# Patient Record
(demographics unavailable — no encounter records)

---

## 2025-03-12 NOTE — ASSESSMENT
[FreeTextEntry1] :   Alert, oriented, well pleasant.   Sun-exposed cutaneous exam. No evidence of cutaneous malignancy.  Brown, yellow papules and plaques generalized.  Seborrheic keratoses. No treatment.  Actinic damage. Reviewed sun protection.  Brown macules and papules less than 0.6cm generalized especially trunk.  Nevi. No treatment.  Erythematous papules especially trunk. Angioma. No treatment.   irregular bordered multicolored brown thin papule left anterior shoulder. Mass ? nevus Informed consent given. Side effects discussed.  Lidocaine epi.  Shave biopsy.  Ferric Chloride.  Tolerated well.  Wound care instructions given.   kerotic erythematous 5mm papule left forehead Inflamed seborrheic keratosis Informed consent given. Side effects discussed. Electrodesiccation Tolerated well. Wound care instructed.  Follow up 1 year.

## 2025-03-12 NOTE — HISTORY OF PRESENT ILLNESS
Detail Level: Detailed Number Of Freeze-Thaw Cycles: 2 freeze-thaw cycles [FreeTextEntry1] : growing bump left forehead, picks. For months. No treatment. Show Applicator Variable?: Yes [de-identified] : No personal or family history of cutaneous malignancy.   Post-Care Instructions: I reviewed with the patient in detail post-care instructions. Patient is to wear sunprotection, and avoid picking at any of the treated lesions. Pt may apply Vaseline to crusted or scabbing areas. Render Post-Care Instructions In Note?: no Consent: The patient's consent was obtained including but not limited to risks of crusting, scabbing, blistering, scarring, darker or lighter pigmentary change, recurrence, incomplete removal and infection. Duration Of Freeze Thaw-Cycle (Seconds): 5